# Patient Record
Sex: FEMALE | Race: WHITE | NOT HISPANIC OR LATINO | Employment: FULL TIME | URBAN - METROPOLITAN AREA
[De-identification: names, ages, dates, MRNs, and addresses within clinical notes are randomized per-mention and may not be internally consistent; named-entity substitution may affect disease eponyms.]

---

## 2017-01-25 ENCOUNTER — OFFICE VISIT (OUTPATIENT)
Dept: URGENT CARE | Facility: PHYSICIAN GROUP | Age: 47
End: 2017-01-25
Payer: COMMERCIAL

## 2017-01-25 VITALS
OXYGEN SATURATION: 95 % | DIASTOLIC BLOOD PRESSURE: 74 MMHG | WEIGHT: 154 LBS | SYSTOLIC BLOOD PRESSURE: 124 MMHG | HEART RATE: 76 BPM | BODY MASS INDEX: 28.34 KG/M2 | HEIGHT: 62 IN | RESPIRATION RATE: 16 BRPM | TEMPERATURE: 98.1 F

## 2017-01-25 DIAGNOSIS — M77.9 TENDONITIS: ICD-10-CM

## 2017-01-25 PROCEDURE — 99214 OFFICE O/P EST MOD 30 MIN: CPT | Performed by: FAMILY MEDICINE

## 2017-01-25 RX ORDER — DICLOFENAC SODIUM 75 MG/1
75 TABLET, DELAYED RELEASE ORAL 2 TIMES DAILY
Qty: 30 TAB | Refills: 0 | Status: SHIPPED | OUTPATIENT
Start: 2017-01-25 | End: 2019-01-11

## 2017-01-25 ASSESSMENT — ENCOUNTER SYMPTOMS
MUSCLE WEAKNESS: 0
TINGLING: 0
NUMBNESS: 0

## 2017-01-25 NOTE — MR AVS SNAPSHOT
"        Silva Ely   2017 4:20 PM   Office Visit   MRN: [id number]    Department:  [facility]   Dept Phone:  [phone]    Description:  Female : 1970   Provider:  King Olivares M.D.           Reason for Visit     Wrist Pain 3 weeks       Allergies as of 2017     No Known Allergies      You were diagnosed with     Tendonitis   [[id number]]         Vital Signs     Blood Pressure Pulse Temperature Respirations Height Weight    124/74 mmHg 76 36.7 °C (98.1 °F) 16 1.575 m (5' 2.01\") 69.854 kg (154 lb)    Body Mass Index Oxygen Saturation Smoking Status             28.16 kg/m2 95% Never Smoker          Basic Information     Date Of Birth Sex Race Ethnicity Preferred Language    1970 Female White Non- English      Problem List              ICD-10-CM Priority Class Noted - Resolved    Follicular cyst of ovary N83.00   7/10/2015 - Present      Health Maintenance        Date Due Completion Dates    IMM DTaP/Tdap/Td Vaccine (1 - Tdap) 1988 ---    PAP SMEAR 1990 ---    MAMMOGRAM 2009 ---    IMM INFLUENZA (1) 2016 ---            Current Immunizations     No immunizations on file.      Below and/or attached are the medications your provider expects you to take. Review all of your home medications and newly ordered medications with your provider and/or pharmacist. Follow medication instructions as directed by your provider and/or pharmacist. Please keep your medication list with you and share with your provider. Update the information when medications are discontinued, doses are changed, or new medications (including over-the-counter products) are added; and carry medication information at all times in the event of emergency situations     Allergies:  No Known Allergies          Medications  Valid as of: 2017 -  7:13 PM    Generic Name Brand Name Tablet Size Instructions for use    Ascorbic Acid (Tab) ascorbic acid 500 MG Take 500 mg by mouth every " day.        Azithromycin (Tab) ZITHROMAX 250 MG Take 2 pills by mouth on day one, take 1 pill by mouth on days 2-5        Benzonatate (Cap) TESSALON 200 MG Take 1 Cap by mouth 3 times a day as needed for Cough.        Biotin   Take 300 mcg by mouth every day.        Boron   Take 1 mg by mouth every day.        Calcium-Magnesium-Vitamin D   Take 1 Tab by mouth every day.        Cholecalciferol (Tab) cholecalciferol 1000 UNIT Take 1,000 Units by mouth every day.        Chromium   Take 120 mg by mouth every day.        Copper   Take 1 mg by mouth every day.        Cyanocobalamin   Take 36 mcg by mouth every day.        Diclofenac Sodium (Tablet Delayed Response) VOLTAREN 75 MG Take 1 Tab by mouth 2 times a day.        Docosahexaenoic Acid   Take 180 mg by mouth every day.        Iodine Strong (Lugols)   Take 150 mcg by mouth every day.        Lutein (Tab) Lutein 10 MG Take 5 mg by mouth every day.        Magnesium   Take 225 mcg by mouth every day.        Manganese   Take 2 mg by mouth.        Methocarbamol (Tab) ROBAXIN 500 MG Take 1 Tab by mouth 3 times a day as needed (muscle spasm).        Molybdenum   Take 75 mcg by mouth every day.        Niacin   Take 120 mg by mouth every day.        Non Formulary Request Non Formulary Request  Phosphorous 250 mg daily        Non Formulary Request Non Formulary Request  Sodium 10 mg daily        Non Formulary Request Non Formulary Request  Mixed Topoxeroles  35 mg   daily        Non Formulary Request Non Formulary Request  Nickel 15 mcg daily        Non Formulary Request Non Formulary Request  Tin 10 mcg  daily        Non Formulary Request Non Formulary Request  Silicon 2mg    daily        Non Formulary Request Non Formulary Request  Nutriferon 500mg  Natural blend  daily        Non Formulary Request Non Formulary Request  sycopene 5 mg daily        Omega-3 Fatty Acids (Cap) Omega-3 Fish Oil 500 MG Take  by mouth every day.        Omega-3 Fatty Acids   Take 280 mg by mouth  every day.        Prenatal Vit w/Sr-Kmeiwnked-BT   Take 400 mcg by mouth every day.        Probiotic Product   Take  by mouth. 250 million        Pyridoxine HCl   Take 36 mcg by mouth every day.        Riboflavin   Take 10.2 mg by mouth every day.        Selenium   Take 70 mcg by mouth every day.        Thiamine HCl   Take 9 mg by mouth every day.        Vanadium   Take 20 mcg by mouth every day.        Vitamin A   Take 6,250 Units by mouth every day.        Vitamin E (Cap) vitamin E 200 UNIT Take 200 Units by mouth every day.        Vitamin K   Take 100 mcg by mouth.        Zeaxanthin (Powder) Zeaxanthin  Take  by mouth. 200 MCG daily        Zinc Sulfate   Take 1 Tab by mouth every day.        .                 Medicines prescribed today were sent to:     [facility] [address] [place] [address] - [address] AT [facility]    [address] [facility] [address]    Phone: [phone] Fax: [phone]    Open 24 Hours?: No      Medication refill instructions:       If your prescription bottle indicates you have medication refills left, it is not necessary to call your provider’s office. Please contact your pharmacy and they will refill your medication.    If your prescription bottle indicates you do not have any refills left, you may request refills at any time through one of the following ways: The online [employer] system (except Urgent Care), by calling your provider’s office, or by asking your pharmacy to contact your provider’s office with a refill request. Medication refills are processed only during regular business hours and may not be available until the next business day. Your provider may request additional information or to have a follow-up visit with you prior to refilling your medication.   *Please Note: Medication refills are assigned a new Rx number when refilled electronically. Your pharmacy may indicate that no refills were authorized even though a new prescription for the same  medication is available at the pharmacy. Please request the medicine by name with the pharmacy before contacting your provider for a refill.        Instructions    Wrist Pain  Wrist injuries are frequent in adults and children. A sprain is an injury to the ligaments that hold your bones together. A strain is an injury to muscle or muscle cord-like structures (tendons) from stretching or pulling. Generally, when wrists are moderately tender to touch following a fall or injury, a break in the bone (fracture) may be present. Most wrist sprains or strains are better in 3 to 5 days, but complete healing may take several weeks.  HOME CARE INSTRUCTIONS   · Put ice on the injured area.  ¨ Put ice in a plastic bag.  ¨ Place a towel between your skin and the bag.  ¨ Leave the ice on for 15-20 minutes, 3-4 times a day, for the first 2 days, or as directed by your health care provider.  · Keep your arm raised above the level of your heart whenever possible to reduce swelling and pain.  · Rest the injured area for at least 48 hours or as directed by your health care provider.  · If a splint or elastic bandage has been applied, use it for as long as directed by your health care provider or until seen by a health care provider for a follow-up exam.  · Only take over-the-counter or prescription medicines for pain, discomfort, or fever as directed by your health care provider.  · Keep all follow-up appointments. You may need to follow up with a specialist or have follow-up X-rays. Improvement in pain level is not a guarantee that you did not fracture a bone in your wrist. The only way to determine whether or not you have a broken bone is by X-ray.  SEEK IMMEDIATE MEDICAL CARE IF:   · Your fingers are swollen, very red, white, or cold and blue.  · Your fingers are numb or tingling.  · You have increasing pain.  · You have difficulty moving your fingers.  MAKE SURE YOU:   · Understand these instructions.  · Will watch your  condition.  · Will get help right away if you are not doing well or get worse.     This information is not intended to replace advice given to you by your health care provider. Make sure you discuss any questions you have with your health care provider.     Document Released: 09/27/2006 Document Revised: 12/23/2014 Document Reviewed: 02/08/2012  [employer] Interactive Patient Education ©2016 Elsevier Inc.            inexio Access Code: [email or web address]  Expires: 2/18/2017 11:59 AM    inexio  A secure, online tool to manage your health information     [employer] inexio® is a secure, online tool that connects you to your personalized health information from the privacy of your home -- day or night - making it very easy for you to manage your healthcare. Once the activation process is completed, you can even access your medical information using the inexio alfredo, which is available for free in the [employer] store or Google Play store.     inexio provides the following levels of access (as shown below):   My Chart Features   [facility] Doctor [facility]  Specialists [facility]  Urgent  Care Non-[facility]  Primary Care  Doctor   Email your healthcare team securely and privately 24/7 X X X    Manage appointments: schedule your next appointment; view details of past/upcoming appointments X      Request prescription refills. X      View recent personal medical records, including lab and immunizations X X X X   View health record, including health history, allergies, medications X X X X   Read reports about your outpatient visits, procedures, consult and ER notes X X X X   See your discharge summary, which is a recap of your hospital and/or ER visit that includes your diagnosis, lab results, and care plan. X X       How to register for inexio:  1. Go to  [email or web address].  2. Click on the Sign Up Now box, which takes you to the New Member Sign Up page. You will need to provide the following  information:  a. Enter your Padlet Access Code exactly as it appears at the top of this page. (You will not need to use this code after you’ve completed the sign-up process. If you do not sign up before the expiration date, you must request a new code.)   b. Enter your date of birth.   c. Enter your home email address.   d. Click Submit, and follow the next screen’s instructions.  3. Create a Padlet ID. This will be your Padlet login ID and cannot be changed, so think of one that is secure and easy to remember.  4. Create a Padlet password. You can change your password at any time.  5. Enter your Password Reset Question and Answer. This can be used at a later time if you forget your password.   6. Enter your e-mail address. This allows you to receive e-mail notifications when new information is available in Padlet.  7. Click Sign Up. You can now view your health information.    For assistance activating your Padlet account, call [phone]

## 2017-01-25 NOTE — Clinical Note
January 25, 2017         Patient: Silva Ely   YOB: 1970   Date of Visit: 1/25/2017           To Whom it May Concern:    Silva Ely was seen in my clinic on 1/25/2017. Please allow her to restrict use of her right hand only while at work for the next 7 days. She may use her right hand in parallel with left only for the next week.    If you have any questions or concerns, please don't hesitate to call.        Sincerely,           King Olivares M.D.  Electronically Signed

## 2017-01-26 NOTE — PATIENT INSTRUCTIONS
Wrist Pain  Wrist injuries are frequent in adults and children. A sprain is an injury to the ligaments that hold your bones together. A strain is an injury to muscle or muscle cord-like structures (tendons) from stretching or pulling. Generally, when wrists are moderately tender to touch following a fall or injury, a break in the bone (fracture) may be present. Most wrist sprains or strains are better in 3 to 5 days, but complete healing may take several weeks.  HOME CARE INSTRUCTIONS   · Put ice on the injured area.  ¨ Put ice in a plastic bag.  ¨ Place a towel between your skin and the bag.  ¨ Leave the ice on for 15-20 minutes, 3-4 times a day, for the first 2 days, or as directed by your health care provider.  · Keep your arm raised above the level of your heart whenever possible to reduce swelling and pain.  · Rest the injured area for at least 48 hours or as directed by your health care provider.  · If a splint or elastic bandage has been applied, use it for as long as directed by your health care provider or until seen by a health care provider for a follow-up exam.  · Only take over-the-counter or prescription medicines for pain, discomfort, or fever as directed by your health care provider.  · Keep all follow-up appointments. You may need to follow up with a specialist or have follow-up X-rays. Improvement in pain level is not a guarantee that you did not fracture a bone in your wrist. The only way to determine whether or not you have a broken bone is by X-ray.  SEEK IMMEDIATE MEDICAL CARE IF:   · Your fingers are swollen, very red, white, or cold and blue.  · Your fingers are numb or tingling.  · You have increasing pain.  · You have difficulty moving your fingers.  MAKE SURE YOU:   · Understand these instructions.  · Will watch your condition.  · Will get help right away if you are not doing well or get worse.     This information is not intended to replace advice given to you by your health care  provider. Make sure you discuss any questions you have with your health care provider.     Document Released: 09/27/2006 Document Revised: 12/23/2014 Document Reviewed: 02/08/2012  Elsevier Interactive Patient Education ©2016 Elsevier Inc.

## 2017-01-26 NOTE — PROGRESS NOTES
"Subjective:      Silva Ely is a 46 y.o. female who presents with Wrist Pain            Wrist Pain   The incident occurred more than 1 week ago. There was no injury mechanism. The pain is present in the right wrist. The quality of the pain is described as aching and burning. The pain is moderate. The pain has been fluctuating since the incident. Pertinent negatives include no chest pain, muscle weakness, numbness or tingling.       Review of Systems   Cardiovascular: Negative for chest pain.   Neurological: Negative for tingling and numbness.     No Known Allergies      Objective:     /74 mmHg  Pulse 76  Temp(Src) 36.7 °C (98.1 °F)  Resp 16  Ht 1.575 m (5' 2.01\")  Wt 69.854 kg (154 lb)  BMI 28.16 kg/m2  SpO2 95%     Physical Exam   Constitutional: She is oriented to person, place, and time. She appears well-developed and well-nourished. No distress.   HENT:   Head: Normocephalic and atraumatic.   Eyes: Conjunctivae and EOM are normal. Pupils are equal, round, and reactive to light.   Cardiovascular: Normal rate and regular rhythm.    No murmur heard.  Pulmonary/Chest: Effort normal and breath sounds normal. No respiratory distress.   Abdominal: Soft. She exhibits no distension. There is no tenderness.   Musculoskeletal:        Right wrist: She exhibits tenderness. She exhibits no bony tenderness and no swelling.   Neurological: She is alert and oriented to person, place, and time. She has normal reflexes. No sensory deficit.   Skin: Skin is warm and dry.   Psychiatric: She has a normal mood and affect.               Assessment/Plan:     1. Tendonitis  Differential diagnosis, natural history, supportive care, and indications for immediate follow-up discussed.    - diclofenac EC (VOLTAREN) 75 MG Tablet Delayed Response; Take 1 Tab by mouth 2 times a day.  Dispense: 30 Tab; Refill: 0        "

## 2019-01-11 ENCOUNTER — OFFICE VISIT (OUTPATIENT)
Dept: URGENT CARE | Facility: PHYSICIAN GROUP | Age: 49
End: 2019-01-11
Payer: COMMERCIAL

## 2019-01-11 VITALS
SYSTOLIC BLOOD PRESSURE: 132 MMHG | TEMPERATURE: 98.5 F | DIASTOLIC BLOOD PRESSURE: 90 MMHG | RESPIRATION RATE: 18 BRPM | BODY MASS INDEX: 27.6 KG/M2 | OXYGEN SATURATION: 95 % | HEART RATE: 81 BPM | WEIGHT: 150 LBS | HEIGHT: 62 IN

## 2019-01-11 DIAGNOSIS — S39.012A BACK STRAIN, INITIAL ENCOUNTER: Primary | ICD-10-CM

## 2019-01-11 PROCEDURE — 99214 OFFICE O/P EST MOD 30 MIN: CPT | Performed by: FAMILY MEDICINE

## 2019-01-11 RX ORDER — KETOROLAC TROMETHAMINE 30 MG/ML
60 INJECTION, SOLUTION INTRAMUSCULAR; INTRAVENOUS ONCE
Status: COMPLETED | OUTPATIENT
Start: 2019-01-11 | End: 2019-01-11

## 2019-01-11 RX ORDER — CYCLOBENZAPRINE HCL 10 MG
10 TABLET ORAL NIGHTLY PRN
Qty: 15 TAB | Refills: 0 | Status: SHIPPED | OUTPATIENT
Start: 2019-01-11 | End: 2019-01-26

## 2019-01-11 RX ORDER — DICLOFENAC SODIUM 75 MG/1
75 TABLET, DELAYED RELEASE ORAL 2 TIMES DAILY PRN
Qty: 20 TAB | Refills: 0 | Status: SHIPPED | OUTPATIENT
Start: 2019-01-11 | End: 2019-01-21

## 2019-01-11 RX ADMIN — KETOROLAC TROMETHAMINE 60 MG: 30 INJECTION, SOLUTION INTRAMUSCULAR; INTRAVENOUS at 11:40

## 2019-01-11 ASSESSMENT — ENCOUNTER SYMPTOMS
STRIDOR: 0
CONSTITUTIONAL NEGATIVE: 1
SENSORY CHANGE: 0
CARDIOVASCULAR NEGATIVE: 1
BACK PAIN: 1
NEUROLOGICAL NEGATIVE: 1
COUGH: 0
SORE THROAT: 0
RESPIRATORY NEGATIVE: 1
EYES NEGATIVE: 1
FOCAL WEAKNESS: 0
SINUS PAIN: 0

## 2019-01-11 ASSESSMENT — PAIN SCALES - GENERAL: PAINLEVEL: 7=MODERATE-SEVERE PAIN

## 2019-01-11 NOTE — PATIENT INSTRUCTIONS
Diclofenac as needed  Muscle relaxer at night as needed  Follow up if not significantly improved as expected in 7-10 days, sooner if any worsening or new symptoms      Mid-Back Strain  A strain is a stretch or tear in a muscle or the strong cords of tissue that attach muscle to bone (tendons). Strains of the mid-back (thoracic spine) occur between the neck and the lower back. A strain occurs when muscles or tendons are torn or are stretched beyond their limits. The muscles may become inflamed, resulting in pain and sudden muscle tightening (spasms). A strain can happen suddenly due to an injury (trauma), or it can develop gradually due to overuse.  There are three types of strains:  · Grade 1 is a mild strain involving a minor tear of the muscle fibers or tendons. This may cause some pain but no loss of muscle strength.  · Grade 2 is a moderate strain involving a partial tear of the muscle fibers or tendons. This causes more severe pain and some loss of muscle strength.  · Grade 3 is a severe strain involving a complete tear of the muscle or tendon. This causes severe pain and complete or nearly complete loss of muscle strength.  What are the causes?  This condition may be caused by:  · Trauma, such as a fall or a hit to the body.  · Twisting or overstretching the back. This may result from doing activities that require a lot of energy, such as lifting heavy objects.  What increases the risk?  The following factors may increase your risk of getting this condition:  · Playing contact sports.  · Playing sports that put stress on the middle back, including:  ¨ Gymnastics.  ¨ Rowing.  ¨ Golf.  ¨ Horseback riding.  What are the signs or symptoms?  Symptoms of this condition may include:  · Sharp, dull, or spreading pain in the middle back that does not go away.  · Stiffness.  · Limited range of motion.  · Inability to stand up straight due to stiffness or pain.  · Muscle spasms.  How is this diagnosed?     This  condition may be diagnosed based on:  · Your symptoms.  · Your medical history.  · A physical exam.  ¨ Your health care provider may push on certain areas of your back to determine the source of your pain.  ¨ You may be asked to bend forward, backward, and side to side to assess the severity of your pain and your range of motion.  · Imaging tests, such as:  ¨ X-rays.  ¨ MRI.  How is this treated?  Treatment for this condition may include:  · Applying heat and cold to the affected area.  · Medicines to help relieve pain and to relax your muscles (muscle relaxants).  · NSAIDs to help reduce swelling and discomfort.  · Physical therapy.  When your symptoms improve, it is important to gradually return to your normal routine as soon as possible to reduce pain, avoid stiffness, and avoid loss of muscle strength. Generally, symptoms should improve within 6 weeks of treatment. However, recovery time varies.  Follow these instructions at home:  Managing pain, stiffness, and swelling  · If directed, apply ice to the injured area during the first 24 hours after your injury.  ¨ Put ice in a plastic bag.  ¨ Place a towel between your skin and the bag.  ¨ Leave the ice on for 20 minutes, 2-3 times a day.  · If directed, apply heat to the affected area as often as told by your health care provider. Use the heat source that your health care provider recommends, such as a moist heat pack or a heating pad.  ¨ Place a towel between your skin and the heat source.  ¨ Leave the heat on for 20-30 minutes.  ¨ Remove the heat if your skin turns bright red. This is especially important if you are unable to feel pain, heat, or cold. You may have a greater risk of getting burned.  Activity  · Rest and return to your normal activities as told by your health care provider. Ask your health care provider what activities are safe for you.  · Avoid activities that take a lot of effort (are strenuous) for as long as told by your health care  provider.  · Do exercises as told by your health care provider.  General instructions  · Take over-the-counter and prescription medicines only as told by your health care provider.  · If you have questions or concerns about safety while taking pain medicine, talk with your health care provider.  · Do not drive or operate heavy machinery until you know how your pain medicine affects you.  · Do not use any tobacco products, such as cigarettes, chewing tobacco, and e-cigarettes. Tobacco can delay bone healing. If you need help quitting, ask your health care provider.  · Keep all follow-up visits as told by your health care provider. This is important.  How is this prevented?  · Warm up and stretch before being active.  · Cool down and stretch after being active.  · Give your body time to rest between periods of activity.  · Avoid:  ¨ Being physically inactive for long periods at a time.  ¨ Exercising or playing sports when you are tired or in pain.  · Use correct form when playing sports and lifting heavy objects.  · Use good posture when sitting and standing.  · Maintain a healthy weight.  · Sleep on a mattress with medium firmness to support your back.  · Make sure to use equipment that fits you, including shoes that fit well.  · Be safe and responsible while being active to avoid falls.  · Do at least 150 minutes of moderate-intensity exercise each week, such as brisk walking or water aerobics. Try a form of exercise that takes stress off your back, such as swimming or stationary cycling.  · Maintain physical fitness, including:  ¨ Strength.  ¨ Flexibility.  ¨ Cardiovascular fitness.  ¨ Endurance.  Contact a health care provider if:  · Your back pain does not improve after 6 weeks of treatment.  · Your symptoms get worse.  Get help right away if:  · Your back pain is severe.  · You are unable to stand or walk.  · You develop pain in your legs.  · You develop weakness in your buttocks or legs.  · You have difficulty  controlling when you urinate or when you have a bowel movement.  This information is not intended to replace advice given to you by your health care provider. Make sure you discuss any questions you have with your health care provider.  Document Released: 12/18/2006 Document Revised: 08/24/2017 Document Reviewed: 09/28/2016  Elsevier Interactive Patient Education © 2017 Elsevier Inc.

## 2019-01-11 NOTE — LETTER
January 11, 2019         Patient: Silva Ely   YOB: 1970   Date of Visit: 1/11/2019           To Whom it May Concern:    Silva Ely was seen in my clinic on 1/11/2019. She should be excused 01/10-01/11/2019  If you have any questions or concerns, please don't hesitate to call.        Sincerely,           Leon Singh M.D.  Electronically Signed

## 2019-01-11 NOTE — PROGRESS NOTES
"Subjective:      Silva Ely is a 48 y.o. female who presents with Back Pain (x 1 week// lower to upper back// on and off// tightness and pressure)            This is a new problem   48-year-old otherwise healthy presenting for back pain for the past week which is now moving upward.  She denies any recent injury.  Denies any radiculopathy, paresthesias or muscle weakness.  Denies any loss of bladder or bowel control.  She has been using some aspirin the other day which did not help.  Denies any back surgery.  She denies any urinary frequency, urgency or dysuria.    She also wants me to check her nose and sinuses because she felt it was swollen.  Denies any recent cold symptoms.  Denies any fever chills.        Review of Systems   Constitutional: Negative.    HENT: Negative.  Negative for ear pain, sinus pain and sore throat.    Eyes: Negative.    Respiratory: Negative.  Negative for cough and stridor.    Cardiovascular: Negative.    Musculoskeletal: Positive for back pain.   Skin: Negative.    Neurological: Negative.  Negative for sensory change and focal weakness.          Objective:     /90 (BP Location: Left arm, Patient Position: Sitting)   Pulse 81   Temp 36.9 °C (98.5 °F) (Temporal)   Resp 18   Ht 1.575 m (5' 2\")   Wt 68 kg (150 lb)   SpO2 95%   BMI 27.44 kg/m²      Physical Exam   Constitutional: She is oriented to person, place, and time. She appears well-developed and well-nourished.  Non-toxic appearance. She does not have a sickly appearance. She does not appear ill. No distress.   HENT:   Head: Normocephalic and atraumatic.   Right Ear: Tympanic membrane, external ear and ear canal normal.   Left Ear: Tympanic membrane, external ear and ear canal normal.   Nose: Nose normal. No mucosal edema, nose lacerations or nasal deformity.  No foreign bodies.   Mouth/Throat: Uvula is midline and oropharynx is clear and moist. No oral lesions. No trismus in the jaw. No uvula swelling. No " oropharyngeal exudate, posterior oropharyngeal edema, posterior oropharyngeal erythema or tonsillar abscesses.   Eyes: Conjunctivae are normal.   Cardiovascular: Normal rate.    Pulmonary/Chest: Effort normal. No respiratory distress.   Musculoskeletal:        Thoracic back: She exhibits decreased range of motion and spasm. She exhibits no bony tenderness, no swelling, no edema, no deformity, no laceration and normal pulse.        Lumbar back: She exhibits decreased range of motion, tenderness (On palpation of the paraspinal region in the outlined area, no swelling or bruising noted) and spasm. She exhibits no bony tenderness, no swelling, no edema, no deformity, no laceration and normal pulse.        Back:    Neurological: She is alert and oriented to person, place, and time. She has normal strength and normal reflexes. No sensory deficit.   Negative straight leg raising bilaterally   Skin: She is not diaphoretic.               Assessment/Plan:     1. Back strain, initial encounter  - ketorolac (TORADOL) injection 60 mg; 2 mL by Intramuscular route Once.  - diclofenac EC (VOLTAREN) 75 MG Tablet Delayed Response; Take 1 Tab by mouth 2 times a day as needed for up to 10 days.  Dispense: 20 Tab; Refill: 0  - cyclobenzaprine (FLEXERIL) 10 MG Tab; Take 1 Tab by mouth at bedtime as needed for up to 15 days.  Dispense: 15 Tab; Refill: 0    Significant improvement after Toradol IM  Patient has otherwise normal neurologic exam except for muscle spasm  Recommend a trial of muscle relaxer at night as needed, icing or heat  Diclofenac twice daily as needed for pain, advised not to use any over-the-counter anti-inflammatory while on diclofenac